# Patient Record
Sex: FEMALE | Employment: OTHER | URBAN - METROPOLITAN AREA
[De-identification: names, ages, dates, MRNs, and addresses within clinical notes are randomized per-mention and may not be internally consistent; named-entity substitution may affect disease eponyms.]

---

## 2018-12-13 ENCOUNTER — THERAPY VISIT (OUTPATIENT)
Dept: PHYSICAL THERAPY | Facility: CLINIC | Age: 73
End: 2018-12-13
Payer: COMMERCIAL

## 2018-12-13 DIAGNOSIS — M25.551 HIP PAIN, RIGHT: Primary | ICD-10-CM

## 2018-12-13 PROCEDURE — 97140 MANUAL THERAPY 1/> REGIONS: CPT | Mod: GP | Performed by: PHYSICAL THERAPIST

## 2018-12-13 PROCEDURE — 97161 PT EVAL LOW COMPLEX 20 MIN: CPT | Mod: GP | Performed by: PHYSICAL THERAPIST

## 2018-12-13 PROCEDURE — 97110 THERAPEUTIC EXERCISES: CPT | Mod: GP | Performed by: PHYSICAL THERAPIST

## 2018-12-13 NOTE — PROGRESS NOTES
[facility] Initial Evaluation  Subjective:    Tia Price is a 73 year old female with a right hip condition.        R hip pain started 4/1/18 following R TKA. Reports R hip feels stiff and doesn't work as well as she wants to with walking. Also having R lower leg numbness following that hasn't gone away..    Patient reports pain:  Groin and posterior.  Radiates to: numbness reported in lateral ankle and distal lower leg.  Pain is described as aching and is intermittent and reported as 2/10.   Pain is worse during the day.  Symptoms are exacerbated by walking and relieved by nothing.  Since onset symptoms are unchanged.        General health as reported by patient is excellent.  Pertinent medical history includes:  Hepatitis and numbness/tingling.  Medical allergies: no.  Surgical history: B knees, hip, shoulder.  Current medications:  Anti-inflammatory.  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  Patient is working in normal job without restrictions.  Primary job tasks include:  Prolonged standing and prolonged sitting (computer work).    Barriers include:  None as reported by the patient.    Red flags:  None as reported by the patient.                        Objective:  System    Ankle/Foot Evaluation  ROM:  AROM is normal.PROM is normal.      Strength:    Dorsiflexion:  Left: 5-/5     Pain:   Right: 5-/5   Pain:  Plantarflexion: Left: 5-/5   Pain:   Right: 5-/5  Pain:  Inversion:Left: 5-/5  Pain:     Right: 5-/5  Pain:  Eversion:Left: 5-/5  Pain:  Right: 5-/5  Pain:                                 Lumbar/SI Evaluation  ROM:  AROM Lumbar: normal                                                          Hip Evaluation  Hip PROM:                      Endfeel: mild limitations in hip ER/ext      Hip Strength:    Flexion:   Left: 5-/5   Pain:  Right: 5-/5   Pain:                    Extension:  Left: 5-/5  Pain:Right: 5-/5    Pain:    Abduction:  Left: 4+/5      Pain:Right: 4+/5    Pain:      External Rotation:  Left: 4+/5   Pain:  Right: 4+/5   Pain:  Knee Flexion:  Left: 5-/5   Pain:Right: 5-/5   Pain:  Knee Extension:  Left: 5-/5   Pain:Right: 5-/5    Pain:                 Knee Evaluation:  ROM:  AROM: normal  PROM: normal                              General     ROS    Assessment/Plan:    Patient is a 73 year old female with right side hip complaints.    Patient has the following significant findings with corresponding treatment plan.                Diagnosis 1:  R hip pain  Pain -  manual therapy, splint/taping/bracing/orthotics, self management, education, directional preference exercise and home program  Decreased ROM/flexibility - manual therapy and therapeutic exercise  Decreased joint mobility - manual therapy and therapeutic exercise  Decreased strength - therapeutic exercise and therapeutic activities  Impaired balance - neuro re-education and therapeutic activities    Therapy Evaluation Codes:   1) History comprised of:   Personal factors that impact the plan of care:      None.    Comorbidity factors that impact the plan of care are:      Numbness/tingling.     Medications impacting care: Anti-inflammatory.  2) Examination of Body Systems comprised of:   Body structures and functions that impact the plan of care:      Hip.   Activity limitations that impact the plan of care are:      Squatting/kneeling, Stairs, Standing, Walking, Working, Sleeping and Laying down.  3) Clinical presentation characteristics are:   Stable/Uncomplicated.  4) Decision-Making    Low complexity using standardized patient assessment instrument and/or measureable assessment of functional outcome.  Cumulative Therapy Evaluation is: Low complexity.    Previous and current functional limitations:  (See Goal Flow Sheet for this information)    Short term and Long term goals: (See Goal Flow Sheet for this information)     Communication ability:  Patient appears to be able to clearly  communicate and understand verbal and written communication and follow directions correctly.  Treatment Explanation - The following has been discussed with the patient:   RX ordered/plan of care  Anticipated outcomes  Possible risks and side effects  This patient would benefit from PT intervention to resume normal activities.   Rehab potential is fair.    Frequency:  eval only  Discharge Plan:  Achieve all LTG.  Independent in home treatment program.  Reach maximal therapeutic benefit.    Please refer to the daily flowsheet for treatment today, total treatment time and time spent performing 1:1 timed codes.